# Patient Record
Sex: MALE | Race: WHITE | NOT HISPANIC OR LATINO | URBAN - METROPOLITAN AREA
[De-identification: names, ages, dates, MRNs, and addresses within clinical notes are randomized per-mention and may not be internally consistent; named-entity substitution may affect disease eponyms.]

---

## 2021-02-22 ENCOUNTER — IMMUNIZATIONS (OUTPATIENT)
Dept: FAMILY MEDICINE CLINIC | Facility: HOSPITAL | Age: 56
End: 2021-02-22

## 2021-02-22 DIAGNOSIS — Z23 ENCOUNTER FOR IMMUNIZATION: Primary | ICD-10-CM

## 2021-02-22 PROCEDURE — 91300 SARS-COV-2 / COVID-19 MRNA VACCINE (PFIZER-BIONTECH) 30 MCG: CPT

## 2021-02-22 PROCEDURE — 0001A SARS-COV-2 / COVID-19 MRNA VACCINE (PFIZER-BIONTECH) 30 MCG: CPT

## 2021-03-15 ENCOUNTER — IMMUNIZATIONS (OUTPATIENT)
Dept: FAMILY MEDICINE CLINIC | Facility: HOSPITAL | Age: 56
End: 2021-03-15

## 2021-03-15 DIAGNOSIS — Z23 ENCOUNTER FOR IMMUNIZATION: Primary | ICD-10-CM

## 2021-03-15 PROCEDURE — 0002A SARS-COV-2 / COVID-19 MRNA VACCINE (PFIZER-BIONTECH) 30 MCG: CPT

## 2021-03-15 PROCEDURE — 91300 SARS-COV-2 / COVID-19 MRNA VACCINE (PFIZER-BIONTECH) 30 MCG: CPT

## 2023-07-17 ENCOUNTER — OFFICE VISIT (OUTPATIENT)
Dept: SURGERY | Facility: CLINIC | Age: 58
End: 2023-07-17
Payer: COMMERCIAL

## 2023-07-17 VITALS — BODY MASS INDEX: 27.99 KG/M2 | HEIGHT: 73 IN | WEIGHT: 211.2 LBS

## 2023-07-17 DIAGNOSIS — D48.5 NEOPLASM OF UNCERTAIN BEHAVIOR OF SKIN: Primary | ICD-10-CM

## 2023-07-17 PROCEDURE — 99203 OFFICE O/P NEW LOW 30 MIN: CPT | Performed by: PLASTIC SURGERY

## 2023-07-17 PROCEDURE — 3008F BODY MASS INDEX DOCD: CPT | Performed by: PLASTIC SURGERY

## 2023-07-17 RX ORDER — FLUOXETINE 10 MG/1
10 CAPSULE ORAL DAILY
COMMUNITY

## 2023-07-17 RX ORDER — ATORVASTATIN CALCIUM 20 MG/1
20 TABLET, FILM COATED ORAL DAILY
COMMUNITY

## 2023-07-17 RX ORDER — LISINOPRIL 20 MG/1
10 TABLET ORAL DAILY
COMMUNITY

## 2023-07-17 RX ORDER — FAMOTIDINE 10 MG/1
10 TABLET ORAL 2 TIMES DAILY
COMMUNITY

## 2023-07-17 RX ORDER — BUTYROSPERMUM PARKII(SHEA BUTTER), SIMMONDSIA CHINENSIS (JOJOBA) SEED OIL, ALOE BARBADENSIS LEAF EXTRACT .01; 1; 3.5 G/100G; G/100G; G/100G
250 LIQUID TOPICAL 2 TIMES DAILY
COMMUNITY

## 2023-07-18 PROBLEM — D48.5 NEOPLASM OF UNCERTAIN BEHAVIOR OF SKIN: Status: ACTIVE | Noted: 2023-07-18

## 2023-07-18 ASSESSMENT — ENCOUNTER SYMPTOMS
CONSTITUTIONAL NEGATIVE: 1
CARDIOVASCULAR NEGATIVE: 1

## 2023-07-18 NOTE — ASSESSMENT & PLAN NOTE
Patient is a 58-year-old gentleman with 3 papules on his scalp sent by dermatology.  Physical examination he has 3 benign appearing lesions on the scalp.  Risks and benefits of the procedure explained to the patient in detail.  Given that the patient would have a scar and likely contour irregularity he will think about things.  It is okay to schedule him for minor procedure in the operating room if he calls back.  Patient aware that we cannot be certain of pathology without a biopsy.  Skin checks with dermatology.

## 2023-07-18 NOTE — PROGRESS NOTES
"DETAILS OF NEW PATIENT CONSULTATION     Consulting Service:  [facility]     Referring Physician: Self-Referred    Reason for Consultation:   Chief Complaint   Patient presents with   • Consult     Flesh colored lesions scalp.  Ref by derm.  No bx.       PCP: Veronika Aiken PA C   HISTORY OF PRESENT ILLNESS     Marcial Thibodeaux is a 58 y.o. male actively treated for moles on scalp.       PAST MEDICAL AND SURGICAL HISTORY     Past Medical History:   Diagnosis Date   • Depression    • Hypertension    • Lipid disorder        Past Surgical History:   Procedure Laterality Date   • LIPOMA RESECTION N/A    • LITHOTRIPSY N/A 2018   • TONSILLECTOMY            MEDICATIONS     Current Outpatient Medications on File Prior to Visit   Medication Sig Dispense Refill   • atorvastatin (LIPITOR) 20 mg tablet Take 20 mg by mouth daily.     • famotidine (PEPCID) 10 mg tablet Take 10 mg by mouth 2 (two) times a day.     • FLUoxetine (PROzac) 10 mg capsule Take 10 mg by mouth daily.     • lisinopriL (PRINIVIL) 20 mg tablet Take 10 mg by mouth daily.     • saccharomyces boulardii (FLORASTOR) 250 mg capsule Take 250 mg by mouth 2 (two) times a day.       No current facility-administered medications on file prior to visit.          ALLERGIES     Patient has no known allergies.     SOCIAL HISTORY     Social History     Tobacco Use   • Smoking status: Never   • Smokeless tobacco: Never   Vaping Use   • Vaping Use: Never used   Substance Use Topics   • Alcohol use: Yes   • Drug use: Never          REVIEW OF SYSTEMS   Review of Systems    Review of Systems   Constitutional: Negative.    HENT: Negative.    Cardiovascular: Negative.            PHYSICAL EXAMINATION   Visit Vitals  Ht 1.854 m (6' 1\")   Wt 95.8 kg (211 lb 3.2 oz)   BMI 27.86 kg/m²      Body mass index is 27.86 kg/m².    Physical Exam  Constitutional:       Appearance: Normal appearance.   HENT:      Head: Normocephalic and atraumatic.   Pulmonary:      " Effort: Pulmonary effort is normal.   Skin:     General: Skin is warm and dry.      Comments: Right, posterior, and left papule   Neurological:      Mental Status: He is alert.              ASSESSMENT AND PLAN   Neoplasm of uncertain behavior of skin  Patient is a 58-year-old gentleman with 3 papules on his scalp sent by dermatology.  Physical examination he has 3 benign appearing lesions on the scalp.  Risks and benefits of the procedure explained to the patient in detail.  Given that the patient would have a scar and likely contour irregularity he will think about things.  It is okay to schedule him for minor procedure in the operating room if he calls back.  Patient aware that we cannot be certain of pathology without a biopsy.  Skin checks with dermatology.      Jose Meyer MD  7/18/2023     Thank you very much for allowing us to participate in the care of your patient. Please do not hesitate to call or email if there are any questions.